# Patient Record
Sex: FEMALE | Race: WHITE | NOT HISPANIC OR LATINO | URBAN - METROPOLITAN AREA
[De-identification: names, ages, dates, MRNs, and addresses within clinical notes are randomized per-mention and may not be internally consistent; named-entity substitution may affect disease eponyms.]

---

## 2019-10-03 ENCOUNTER — OFFICE (OUTPATIENT)
Dept: URBAN - METROPOLITAN AREA CLINIC 11 | Facility: CLINIC | Age: 59
End: 2019-10-03

## 2019-10-03 VITALS
HEIGHT: 62 IN | WEIGHT: 171 LBS | HEART RATE: 64 BPM | SYSTOLIC BLOOD PRESSURE: 127 MMHG | DIASTOLIC BLOOD PRESSURE: 79 MMHG

## 2019-10-03 DIAGNOSIS — R10.11 RIGHT UPPER QUADRANT PAIN: ICD-10-CM

## 2019-10-03 DIAGNOSIS — R11.0 NAUSEA: ICD-10-CM

## 2019-10-03 LAB
COMP. METABOLIC PANEL (14): A/G RATIO: 1.9 (ref 1.2–2.2)
COMP. METABOLIC PANEL (14): ALBUMIN: 5 G/DL (ref 3.5–5.5)
COMP. METABOLIC PANEL (14): ALKALINE PHOSPHATASE: 103 IU/L (ref 39–117)
COMP. METABOLIC PANEL (14): ALT (SGPT): 29 IU/L (ref 0–32)
COMP. METABOLIC PANEL (14): AST (SGOT): 19 IU/L (ref 0–40)
COMP. METABOLIC PANEL (14): BILIRUBIN, TOTAL: 0.2 MG/DL (ref 0–1.2)
COMP. METABOLIC PANEL (14): BUN/CREATININE RATIO: 19 (ref 9–23)
COMP. METABOLIC PANEL (14): BUN: 13 MG/DL (ref 6–24)
COMP. METABOLIC PANEL (14): CALCIUM: 10.3 MG/DL — HIGH (ref 8.7–10.2)
COMP. METABOLIC PANEL (14): CARBON DIOXIDE, TOTAL: 22 MMOL/L (ref 20–29)
COMP. METABOLIC PANEL (14): CHLORIDE: 102 MMOL/L (ref 96–106)
COMP. METABOLIC PANEL (14): CREATININE: 0.69 MG/DL (ref 0.57–1)
COMP. METABOLIC PANEL (14): EGFR IF AFRICN AM: 112 ML/MIN/1.73 (ref 59–?)
COMP. METABOLIC PANEL (14): EGFR IF NONAFRICN AM: 97 ML/MIN/1.73 (ref 59–?)
COMP. METABOLIC PANEL (14): GLOBULIN, TOTAL: 2.7 G/DL (ref 1.5–4.5)
COMP. METABOLIC PANEL (14): GLUCOSE: 82 MG/DL (ref 65–99)
COMP. METABOLIC PANEL (14): POTASSIUM: 4.6 MMOL/L (ref 3.5–5.2)
COMP. METABOLIC PANEL (14): PROTEIN, TOTAL: 7.7 G/DL (ref 6–8.5)
COMP. METABOLIC PANEL (14): SODIUM: 144 MMOL/L (ref 134–144)

## 2019-10-03 PROCEDURE — 99204 OFFICE O/P NEW MOD 45 MIN: CPT | Performed by: INTERNAL MEDICINE

## 2019-10-03 RX ORDER — PANTOPRAZOLE SODIUM 40 MG/1
40 TABLET, DELAYED RELEASE ORAL
Qty: 30 | Refills: 3 | Status: ACTIVE
Start: 2019-10-03

## 2019-10-03 NOTE — SERVICENOTES
We reviewed her [facility] surgical reports, CT, HIDA and u/s.  We discussed that I agree wtih the need for the lap alexander with the HIDA and symptoms at the time. We discussed a futher dif dx including ulcer disease, gastritis/duodenitis, biliary dyskinesia, gastroparesis, bile collections, etc...  I would do the Ct, labs, and EGD as discussed and start her on Protonix.  I would like to avoid restarting phenergan in that she may have had an issue with cessionation over the years past.  We also discussed that her last colon appears to have been in about 2005 and that she is over due.

## 2019-10-03 NOTE — SERVICEHPINOTES
She presents for evaluation fo contiued abdominal pain.  She had been having a sudden on set of of n/v and upper abdominal pain.  She was seen in the [facility] and had a normal CT and u/s.  Her HIDA revealed a nonvisualized GB and she had a lap alexander.  Since then she has still had a lot of "nausea and sickness".  She has been taking Zofran but was not certain it has helped. She has been having nausea after eating.  She has had several episodes of vomiting.  Yesterday she vomited about 5 times, after drinking water. She vomited several times over the last weekend. She has been trying to eat jello, chicken broth, and crackers.  She has also had pain in the RUQ and was told that it was likely related to her surgery and recovery. "I have tummy issues way back." This hs been a bit different. She stated that she had been taking a low dose of phenergan and Bentyl for "years and years and years".  She has a hx of H. pylori gastritis and was treated years ago. She did have some constipation post op and took "Lady Correctal" which caused diarrhea.  Since then she has had some dairrhea after eating. She has quite a rambling history and is difficult to follow and also difficult to redirect. Her last colonoscopy appears to have been in 2005.

## 2019-10-11 ENCOUNTER — OFFICE (OUTPATIENT)
Dept: URBAN - METROPOLITAN AREA CLINIC 22 | Facility: CLINIC | Age: 59
End: 2019-10-11

## 2019-10-11 DIAGNOSIS — R10.11 RIGHT UPPER QUADRANT PAIN: ICD-10-CM

## 2019-10-11 DIAGNOSIS — R11.0 NAUSEA: ICD-10-CM

## 2019-10-11 PROCEDURE — 74160 CT ABDOMEN W/CONTRAST: CPT | Performed by: INTERNAL MEDICINE
